# Patient Record
Sex: MALE | Race: WHITE | NOT HISPANIC OR LATINO | Employment: STUDENT | URBAN - METROPOLITAN AREA
[De-identification: names, ages, dates, MRNs, and addresses within clinical notes are randomized per-mention and may not be internally consistent; named-entity substitution may affect disease eponyms.]

---
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
## 2024-07-04 ENCOUNTER — HOSPITAL ENCOUNTER (EMERGENCY)
Facility: HOSPITAL | Age: 24
Discharge: HOME | End: 2024-07-04
Attending: EMERGENCY MEDICINE

## 2024-07-04 VITALS
WEIGHT: 144 LBS | TEMPERATURE: 99 F | BODY MASS INDEX: 23.14 KG/M2 | OXYGEN SATURATION: 98 % | HEART RATE: 91 BPM | HEIGHT: 66 IN | DIASTOLIC BLOOD PRESSURE: 73 MMHG | RESPIRATION RATE: 20 BRPM | SYSTOLIC BLOOD PRESSURE: 116 MMHG

## 2024-07-04 DIAGNOSIS — F14.10 COCAINE ABUSE: Primary | ICD-10-CM

## 2024-07-04 DIAGNOSIS — R07.9 CHEST PAIN: ICD-10-CM

## 2024-07-04 PROCEDURE — 63600175 PHARM REV CODE 636 W HCPCS: Performed by: EMERGENCY MEDICINE

## 2024-07-04 PROCEDURE — 93010 ELECTROCARDIOGRAM REPORT: CPT | Mod: ,,, | Performed by: INTERNAL MEDICINE

## 2024-07-04 PROCEDURE — 96361 HYDRATE IV INFUSION ADD-ON: CPT

## 2024-07-04 PROCEDURE — 93005 ELECTROCARDIOGRAM TRACING: CPT

## 2024-07-04 PROCEDURE — 25000003 PHARM REV CODE 250: Performed by: EMERGENCY MEDICINE

## 2024-07-04 PROCEDURE — 96374 THER/PROPH/DIAG INJ IV PUSH: CPT

## 2024-07-04 PROCEDURE — 99284 EMERGENCY DEPT VISIT MOD MDM: CPT | Mod: 25

## 2024-07-04 RX ORDER — LORAZEPAM 2 MG/ML
1 INJECTION INTRAMUSCULAR
Status: COMPLETED | OUTPATIENT
Start: 2024-07-04 | End: 2024-07-04

## 2024-07-04 RX ADMIN — LORAZEPAM 1 MG: 2 INJECTION INTRAMUSCULAR; INTRAVENOUS at 07:07

## 2024-07-04 RX ADMIN — SODIUM CHLORIDE 1000 ML: 9 INJECTION, SOLUTION INTRAVENOUS at 07:07

## 2024-07-04 NOTE — ED PROVIDER NOTES
EMERGENCY DEPARTMENT HISTORY AND PHYSICAL EXAM     This note is dictated on [employer] word recognition program.  There are word recognition mistakes and grammatical errors that are occasionally missed on review.     Date: 7/4/2024   Patient Name: Nino Collazo       History of Presenting Illness      Chief Complaint   Patient presents with    Chest Pain     Pt to ED via EMS - stated that he used 2grams cocaine in the past 15 hours - stated that he began experiencing chest pain / lightheadedness approx 1 hour PTA.        1900   Nino Collazo is a 23 y.o. male with PMHX of cocaine abuse who presents to the emergency department C/O chest pain.    Patient reports using two grams of cocaine over the past 15 hours.  States over past 2 hours he has been having chest pain.  No ethanol use or other drug use today.    PCP: Kathrin Cota PA-C        No current facility-administered medications for this encounter.     Current Outpatient Medications   Medication Sig Dispense Refill    ondansetron (ZOFRAN-ODT) 4 MG TbDL Take 1 tablet (4 mg total) by mouth every 8 (eight) hours as needed (nausea). 5 tablet 0           Past History     Past Medical History:   History reviewed. No pertinent past medical history.     Past Surgical History:   No past surgical history on file.     Family History:   Family History   Problem Relation Name Age of Onset    No Known Problems Mother      No Known Problems Father      Lupus Maternal Grandmother      No Known Problems Maternal Grandfather      No Known Problems Paternal Grandmother      COPD Paternal Grandfather          Social History:   Social History     Tobacco Use    Smoking status: Never    Smokeless tobacco: Never   Substance Use Topics    Alcohol use: No    Drug use: No        Allergies:   Review of patient's allergies indicates:  No Known Allergies       Review of Systems   Review of Systems   See HPI for pertinent positives and negatives       Physical Exam     Vitals:  "   07/04/24 1849 07/04/24 2046   BP: (!) 143/73 116/73   Pulse: 94 91   Resp: 20    Temp: 98.7 °F (37.1 °C)    SpO2: 98% 98%   Weight: 65.3 kg (144 lb)    Height: 5' 6" (1.676 m)       Physical Exam  Vitals and nursing note reviewed.   Constitutional:       General: He is not in acute distress.     Appearance: Normal appearance. He is well-developed. He is not ill-appearing.   HENT:      Head: Normocephalic and atraumatic.   Eyes:      Extraocular Movements: Extraocular movements intact.      Conjunctiva/sclera: Conjunctivae normal.   Neck:      Vascular: No JVD.   Cardiovascular:      Rate and Rhythm: Normal rate and regular rhythm.      Heart sounds: Normal heart sounds. No murmur heard.  Pulmonary:      Effort: Pulmonary effort is normal. No tachypnea or respiratory distress.   Musculoskeletal:         General: No deformity or signs of injury. Normal range of motion.      Cervical back: Normal range of motion and neck supple. No rigidity.   Skin:     General: Skin is dry.      Coloration: Skin is not pale.      Findings: No rash.   Neurological:      General: No focal deficit present.      Mental Status: He is alert and oriented to person, place, and time.      Cranial Nerves: No cranial nerve deficit.      Motor: No weakness.      Coordination: Coordination normal.   Psychiatric:         Attention and Perception: Attention and perception normal.         Mood and Affect: Affect normal. Mood is anxious.         Behavior: Behavior is cooperative.              Diagnostic Study Results      Labs -   No results found for this or any previous visit (from the past 12 hour(s)).     Radiologic Studies -    No orders to display        Medications given in the ED-   Medications   LORazepam injection 1 mg (1 mg Intravenous Given 7/4/24 1902)   sodium chloride 0.9% bolus 1,000 mL 1,000 mL (0 mLs Intravenous Stopped 7/2002)           Medical Decision Making    I am the first provider for this patient.     I reviewed the " vital signs, available nursing notes, past medical history, past surgical history, family history and social history.     Vital Signs:  Reviewed the patient's vital signs.     Pulse Oximetry Analysis and Interpretation:    98% on Room Air, normal      EKG Interpretation: (Per my independent interpretation, pending formal read)   Interpreted by Melvin Clement MD at 1853   Normal sinus rhythm rate of 81, normal intervals, normal axis, normal EKG unchanged from prior study     External Test Results (Pertinent to encounter):    Records Reviewed: Nursing Notes, Current Prescription Medications, Old Medical Records, and External Medical Records     History Obtained By: Patient    Provider Notes: Nino Collazo is a 23 y.o. male with cocaine use, chest pain    Co-morbidities Considered: cocaine use    Differential Diagnosis:  Sympathomimetic overdose, nonocclusive mi, anxiety      ED Course:    Patient presents with chest pain, anxiety reaction after using large amount of cocaine over the past day and a half.  Vital signs here unremarkable with mild hypertension.  EKG nonischemic.  Patient not in acute distress.  Treat with IV fluids IV Ativan with complete resolution of symptoms.  Counseled on drug use cessation.  Patient appropriate for discharge         Problems Addressed:  Cocaine abuse    Procedures:   Procedures       Diagnosis and Disposition     Critical Care:      DISCHARGE NOTE:       Nino Collazo's  results have been reviewed with him.  He has been counseled regarding his diagnosis, treatment, and plan.  He verbally conveys understanding and agreement of the signs, symptoms, diagnosis, treatment and prognosis and additionally agrees to follow up as discussed.  He also agrees with the care-plan and conveys that all of his questions have been answered.  I have also provided discharge instructions for him that include: educational information regarding their diagnosis and treatment, and list of  reasons why they would want to return to the ED prior to their follow-up appointment, should his condition change. He has been provided with education for proper emergency department utilization.         CLINICAL IMPRESSION:         1. Cocaine abuse    2. Chest pain              PLAN:   1. Discharge Home  2.      Medication List        ASK your doctor about these medications      ondansetron 4 MG Tbdl  Commonly known as: ZOFRAN-ODT  Take 1 tablet (4 mg total) by mouth every 8 (eight) hours as needed (nausea).             3. Kathrin Cota PA-C  [address] [place] [id number]  [phone]    Schedule an appointment as soon as possible for a visit   Primary care follow up    [address] [id number] [phone]  Schedule an appointment as soon as possible for a visit          _______________________________     Please note that this dictation was completed with [employer], the computer voice recognition software.  Quite often unanticipated grammatical, syntax, homophones, and other interpretive errors are inadvertently transcribed by the computer software.  Please disregard these errors.  Please excuse any errors that have escaped final proofreading.             Melvin Clement MD  07/04/24 1526

## 2024-07-05 LAB
OHS QRS DURATION: 84 MS
OHS QTC CALCULATION: 436 MS